# Patient Record
Sex: FEMALE | Race: WHITE
[De-identification: names, ages, dates, MRNs, and addresses within clinical notes are randomized per-mention and may not be internally consistent; named-entity substitution may affect disease eponyms.]

---

## 2017-02-02 ENCOUNTER — HOSPITAL ENCOUNTER (EMERGENCY)
Dept: HOSPITAL 25 - ED | Age: 42
Discharge: HOME | End: 2017-02-02
Payer: COMMERCIAL

## 2017-02-02 VITALS — DIASTOLIC BLOOD PRESSURE: 46 MMHG | SYSTOLIC BLOOD PRESSURE: 111 MMHG
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
## 2017-02-02 DIAGNOSIS — R11.2: ICD-10-CM

## 2017-02-02 DIAGNOSIS — S40.819A: ICD-10-CM
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
## 2017-02-02 DIAGNOSIS — R51: ICD-10-CM

## 2017-02-02 DIAGNOSIS — R53.83: ICD-10-CM

## 2017-02-02 DIAGNOSIS — R50.9: ICD-10-CM

## 2017-02-02 DIAGNOSIS — J02.0: Primary | ICD-10-CM

## 2017-02-02 LAB
ALBUMIN SERPL BCG-MCNC: 4 G/DL (ref 3.2–5.2)
ALP SERPL-CCNC: 56 U/L (ref 34–104)
ALT SERPL W P-5'-P-CCNC: 11 U/L (ref 7–52)
ANION GAP SERPL CALC-SCNC: 9 MMOL/L (ref 2–11)
AST SERPL-CCNC: 13 U/L (ref 13–39)
BUN SERPL-MCNC: 13 MG/DL (ref 6–24)
BUN/CREAT SERPL: 17.3 (ref 8–20)
CALCIUM SERPL-MCNC: 9.1 MG/DL (ref 8.6–10.3)
CHLORIDE SERPL-SCNC: 104 MMOL/L (ref 101–111)
GLOBULIN SER CALC-MCNC: 3.2 G/DL (ref 2–4)
GLUCOSE SERPL-MCNC: 106 MG/DL (ref 70–100)
HCO3 SERPL-SCNC: 20 MMOL/L (ref 22–32)
HCT VFR BLD AUTO: 39 % (ref 35–47)
HGB BLD-MCNC: 13.4 G/DL (ref 12–16)
LIPASE SERPL-CCNC: 18 U/L (ref 11–82)
MCH RBC QN AUTO: 29 PG (ref 27–31)
MCHC RBC AUTO-ENTMCNC: 34 G/DL (ref 31–36)
MCV RBC AUTO: 84 FL (ref 80–97)
POTASSIUM SERPL-SCNC: 3.3 MMOL/L (ref 3.5–5)
PROT SERPL-MCNC: 7.2 G/DL (ref 6.4–8.9)
RBC # BLD AUTO: 4.66 10^6/UL (ref 4–5.4)
SODIUM SERPL-SCNC: 133 MMOL/L (ref 133–145)
WBC # BLD AUTO: 11.6 10^3/UL (ref 3.5–10.8)

## 2017-02-02 PROCEDURE — 86140 C-REACTIVE PROTEIN: CPT

## 2017-02-02 PROCEDURE — 80053 COMPREHEN METABOLIC PANEL: CPT

## 2017-02-02 PROCEDURE — 99283 EMERGENCY DEPT VISIT LOW MDM: CPT

## 2017-02-02 PROCEDURE — 83690 ASSAY OF LIPASE: CPT

## 2017-02-02 PROCEDURE — 85025 COMPLETE CBC W/AUTO DIFF WBC: CPT

## 2017-02-02 PROCEDURE — 87502 INFLUENZA DNA AMP PROBE: CPT

## 2017-02-02 PROCEDURE — 36415 COLL VENOUS BLD VENIPUNCTURE: CPT

## 2017-02-02 PROCEDURE — 96374 THER/PROPH/DIAG INJ IV PUSH: CPT

## 2017-02-02 PROCEDURE — 81003 URINALYSIS AUTO W/O SCOPE: CPT

## 2017-02-02 PROCEDURE — 87651 STREP A DNA AMP PROBE: CPT

## 2017-02-02 PROCEDURE — 81015 MICROSCOPIC EXAM OF URINE: CPT

## 2017-02-02 PROCEDURE — 96375 TX/PRO/DX INJ NEW DRUG ADDON: CPT

## 2017-02-02 PROCEDURE — 83605 ASSAY OF LACTIC ACID: CPT

## 2017-02-02 NOTE — ED
Influenza-Like Illness





- HPI Summary


HPI Summary: 


Pt here w/ acute onset flu-like sx last night while at work. Fever, chills, HA, 

N+V, ST, sneezing, rhinorrhea w/ PND, otalgia, body aches, sore neck glands. 

Denies cough, chest pain, SOB, rash, diarrhea, ab pain. Did not receive 

influenza vaccine this year. 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 Has not tried anything to alleviate 

sx yet. Also reports she was scratched on her hand/arm by her cat 2 days ago - 

area is healing well - no swelling, redness or streaking.  





LMP - having this now. 





- History of Current Complaint


Chief Complaint: EDFluSymptoms


Time Seen by Provider: 17 07:11


Hx Obtained From: Patient





- Allergy/Home Medications


Allergies/Adverse Reactions: 


 Allergies











Allergy/AdvReac Type Severity Reaction Status Date / Time


 


Shellfish Allergy Allergy  Swelling Verified 04/25/15 17:54


 


Antihistamines, AdvReac  See Comment Verified 04/25/15 17:54





Diphenhydramine-typ     














PMH/Surg Hx/FS Hx/Imm Hx


Previously Healthy: Yes


Endocrine/Hematology History: 


   Denies: Hx Diabetes, Autoimmune Disease


Cardiovascular History: 


   Denies: Hx Hypertension, Hx Pacemaker/ICD


Respiratory History: 


   Denies: Hx Asthma


GI History: 


   Denies: Hx Crohn's Disease


Sensory History: 


   Denies: Hx Hearing Aid


Neurological History: Reports: Other Neuro Impairments/Disorders - 

neurofibromatosis


Psychiatric History: 


   Denies: Hx Panic Disorder





- Cancer History


Hx Chemotherapy: No


Hx Radiation Therapy: No





- Surgical History


Surgery Procedure, Year, and Place: TUBAL LIGATION 2006, MULTIPLE FIBROMAS 

REMOVED, 





- Immunization History


Date of Tetanus Vaccine: Unk


Date of Influenza Vaccine: None


Infectious Disease History: No


Infectious Disease History: 


   Denies: History Other Infectious Disease - mono, Traveled Outside the [place] in 

Last 30 Days





- Family History


Known Family History: Positive: Cardiac Disease, Other - cancer





- Social History


Occupation: Employed Full-time


Lives: With Family - children


Alcohol Use: Occasionally - handful of times a year


Hx Substance Use: No


Substance Use Type: Reports: None


Hx Tobacco Use: No


Smoking Status (MU): Never Smoked Tobacco





Review of Systems


Positive: Fever, Chills, Fatigue


Negative: Photophobia, Blurred Vision, Diplopia, Drainage, Erythema


ENT: Other - see HPI


Negative: Chest Pain


Negative: Shortness Of Breath, Cough


Gastrointestinal: Other - see HPI


Positive: no symptoms reported


Positive: Myalgia - see HPI


Negative: Rash


Positive: Headache.  Negative: Weakness, Paresthesia, Numbness


Psychological: Normal


All Other Systems Reviewed And Are Negative: Yes





Physical Exam


Triage Information Reviewed: Yes


Vital Signs On Initial Exam: 


 Initial Vitals











Temp Pulse Resp BP Pulse Ox


 


 102.4 F   97   18   117/58   100 


 


 17 06:56  17 06:56  17 06:56  17 06:56  17 06:56











Vital Signs Reviewed: Yes


Appearance: Positive: Well-Nourished, Ill-Appearing - pt wearing coat with sandhu 

over head, Pain Distress - HA - appears mildly photosensitive


Skin: Positive: Warm, Dry - diffuse skin flesh-colored tumors; scant small 

scabbed linear abrasions over Lt hand/wrist area - no erythema, no edema, no d/c

, no streaking - NTTP


Head/Face: Positive: Normal Head/Face Inspection


Eyes: Positive: Normal, EOMI, EVIN, Conjunctiva Clear, Conjunctiva Inflammed, 

Discharge


ENT: Positive: Hearing grossly normal, Pharyngeal erythema - mild along 

peritonsilar arches w/ cobblestoning, TMs normal.  Negative: Nasal congestion, 

Nasal drainage, Tonsillar swelling, Tonsillar exudate


Neck: Positive: Supple, Tenderness @, Enlarged Nodes @ - shotty anterior cc LN's


Respiratory/Lung Sounds: Positive: Clear to Auscultation, Breath Sounds 

Present.  Negative: Rales, Rhonchi, Stridor, Wheezes


Cardiovascular: Positive: Normal, RRR, S1, S2.  Negative: Murmur, Rub


Abdomen Description: Positive: No Organomegaly, Soft, Other: - mild LUQ TTP - 

no rebounding


Bowel Sounds: Positive: Present


Musculoskeletal: Positive: Normal, Strength/ROM Intact


Neurological: Positive: Normal, Sensory/Motor Intact, Alert, Oriented to Person 

Place, Time, CN Intact II-III


Psychiatric: Positive: Normal





Diagnostics





- Vital Signs


 Vital Signs











  Temp Pulse Resp BP Pulse Ox


 


 17 06:56  102.4 F  97  18  117/58  100














- Laboratory


Result Diagrams: 


 17 07:40





 17 07:40


Lab Statement: Any lab studies that have been ordered have been reviewed, and 

results considered in the medical decision making process.





Re-Evaluation





- Re-Evaluation


  ** First Eval


Change: Improved - fever, HA, nausea, myalgias improved s/p fluids, toradol and 

zofran





Flu Symptom Course/Dx





- Course


Course Of Treatment: Pt's influenza is negative however strep is +. She has a h/

o strep as a child but none as an adult. She could be colonized however has ST 

today so will tx. D/t systemic sx along w/ lymphadenopathy and LUQ (possible 

splenomegaly) will also cover for cat scratch disease in light of her recent 

injury. Will tx w/ zpak as this covers both strep and CSD. Pt advised to f/u w/ 

PCP next week for re-eval and to return here sooner if danger s/sx present. Pt 

agrees w/ plan.





- Diagnoses


Provider Diagnoses: 


 Strep throat, Cat scratch








Discharge





- Discharge Plan


Condition: Stable


Disposition: HOME


Prescriptions: 


Azithromycin TAB* [Zithromax TAB (Z-CECILIA) 250 mg #6 tabs] 250 mg PO DAILY #4 tab


Ondansetron ODT TAB* [Zofran Odt TAB*] 4 mg PO Q8H PRN #9 tab.odt


 PRN Reason: Nausea


Patient Education Materials:  Strep Throat (ED), Cat Scratch Disease (ED)


Forms:  *Work Release


Referrals: 


Kwasi An MD [Primary Care Provider] - 


Additional Instructions: 


You may try the following to help with symptoms:





*Nasal wash (netti pot) & throat gargle 2 x day with 8 ounces of warm water + 1/

4 teaspoon of salt





*Drink 60+ ounces of water daily





*Sleep 8+ hours per night





*You may take ibuprofen alternating with acetaminophen for fever, pain





*Avoid Dairy and sugar





*Hot herbal/decaf tea with lemon & honey  





*Chicken broth (preferably organic, free range chicken)





*Humidifier in house, but especially near bed at night for cough





*Try a facial steam with or without eucalyptus essential oil for cough, nasal 

congestion





**Consider taking  Vitamin C 1,000mg every day during illness**





Start probiotics in between and after completion of antibiotics (ie. Yogurt and/

or capsules of L. acidophilus, L. bifidus, L.  casei, etc - make sure to get 

these from the refrigerated food section as they are live and active cultures)





Follow-up with PCP next week. If symptoms worsen prior, return to ED

## 2017-05-15 ENCOUNTER — HOSPITAL ENCOUNTER (EMERGENCY)
Dept: HOSPITAL 25 - ED | Age: 42
Discharge: HOME | End: 2017-05-15
Payer: COMMERCIAL

## 2017-05-15 VITALS — SYSTOLIC BLOOD PRESSURE: 105 MMHG | DIASTOLIC BLOOD PRESSURE: 37 MMHG
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
## 2017-05-15 DIAGNOSIS — S61.212A: Primary | ICD-10-CM

## 2017-05-15 DIAGNOSIS — S61.214A: ICD-10-CM

## 2017-05-15 PROCEDURE — 90715 TDAP VACCINE 7 YRS/> IM: CPT

## 2017-05-15 PROCEDURE — 99282 EMERGENCY DEPT VISIT SF MDM: CPT

## 2017-05-15 PROCEDURE — 12002 RPR S/N/AX/GEN/TRNK2.6-7.5CM: CPT

## 2017-05-15 PROCEDURE — 90471 IMMUNIZATION ADMIN: CPT

## 2017-05-15 NOTE — ED
Skin Complaint





- HPI Summary


HPI Summary: 


Pt here w/ finger wounds - Rt 3rd and 4th Rt fingers - cut on a paperslicer at 

work today. 3rd finger lac is larger, bleeding more. 4th finger lac smaller and 

stopped bleeding w/ pressure. Not sure of her last tetanus vaccine. No numbness

, tingling, weakness - FROM and strength in affected phalanges and UE in 

general.





- History of Current Complaint


Chief Complaint: EDLacSutureRecheck


Time Seen by Provider: 05/15/17 10:30


Stated Complaint: RT HAND / FINGER LAC


Hx Obtained From: Patient


Pain Intensity: 9





- Allergy/Home Medications


Allergies/Adverse Reactions: 


 Allergies











Allergy/AdvReac Type Severity Reaction Status Date / Time


 


Shellfish Allergy Allergy  Swelling Verified 04/25/15 17:54


 


Antihistamines, AdvReac  See Comment Verified 04/25/15 17:54





Diphenhydramine-typ     














PMH/Surg Hx/FS Hx/Imm Hx


Previously Healthy: Yes


Endocrine/Hematology History: 


   Denies: Hx Anticoagulant Therapy, Hx Blood Disorders, Hx Diabetes, Hx 

Unexplained Bleeding


Cardiovascular History: 


   Denies: Hx Hypertension, Hx Pacemaker/ICD


Respiratory History: 


   Denies: Hx Asthma


GI History: 


   Denies: Hx Crohn's Disease


Sensory History: 


   Denies: Hx Hearing Aid


Neurological History: Reports: Other Neuro Impairments/Disorders - 

neurofibromatosis


Psychiatric History: 


   Denies: Hx Panic Disorder





- Cancer History


Hx Chemotherapy: No


Hx Radiation Therapy: No





- Surgical History


Surgery Procedure, Year, and Place: TUBAL LIGATION 2006, MULTIPLE FIBROMAS 

REMOVED, 





- Immunization History


Date of Tetanus Vaccine: Unk


Date of Influenza Vaccine: None


Infectious Disease History: No


Infectious Disease History: 


   Denies: History Other Infectious Disease - mono, Traveled Outside the [place] in 

Last 30 Days





- Family History


Known Family History: Positive: Cardiac Disease, Other - cancer





- Social History


Occupation: Employed Full-time


Alcohol Use: None


Hx Substance Use: No


Substance Use Type: Reports: None


Hx Tobacco Use: No


Smoking Status (MU): Never Smoked Tobacco





Review of Systems


Negative: Fever, Chills, Fatigue


Negative: Chest Pain


Negative: Shortness Of Breath


Negative: Nausea


Negative: Decreased ROM, Edema


Skin: Other - see HPI


Neurological: Negative


Negative: Weakness, Paresthesia, Numbness


Positive: Anxious


All Other Systems Reviewed And Are Negative: Yes





Physical Exam


Triage Information Reviewed: Yes


Vital Signs On Initial Exam: 


 Initial Vitals











Temp Pulse Resp BP Pulse Ox


 


 96.9 F   68   20   125/57   100 


 


 05/15/17 09:35  05/15/17 09:35  05/15/17 09:35  05/15/17 09:35  05/15/17 09:35











Vital Signs Reviewed: Yes


Appearance: Positive: Well-Appearing, No Pain Distress, Well-Nourished - 

pleasant, concerned


Skin: Positive: Warm - superficial laceration of 4th distal phalange - no 

active bleeding; flap laceration of 3rd distal dorsa phalange - bleeding s/p 

lavage and exploration - black streaking along tissue - skimmed through part of 

subcutaneous tissue - no tendon observed d/t minimal depth - no nerves or 

vessels observed


Head/Face: Positive: Normal Head/Face Inspection


Eyes: Positive: Normal, EOMI, Conjunctiva Clear


ENT: Positive: Hearing grossly normal, Pharynx normal


Respiratory/Lung Sounds: Positive: Breath Sounds Present


Cardiovascular: Positive: Normal, Pulses are Symmetrical in both Upper and 

Lower Extremities


Musculoskeletal: Positive: Normal, Strength/ROM Intact


Neurological: Positive: Normal, Sensory/Motor Intact, Alert, Oriented to Person 

Place, Time, CN Intact II-III, Reflexes Intact


Psychiatric: Positive: Normal





Procedures





- Laceration/Wound Repair


  ** 1


Location: upper extremity


Length, Depth and Shape: flap w/ black streak within tissue - looked like 

necrotic vessel?


Betadine Prep?: Yes


Suture Type: Prolene - 5-0


Number of Sutures: 7





  ** 2


Location: upper extremity - Rt 4th distal dorsal digit, superficial laceration


Description: Irregular - flap


Length, Depth and Shape: 0.5cm x 1.5mm


Betadine Prep?: No


Irrigated w/ Saline (ccs): 500 - iodine solution soak


Laceration/Wound Explored: clean


Number of Sutures: 0


Layer Closure?: No


Sterile Dressing Applied?: Yes - triple anbx + bandaid





Diagnostics





- Vital Signs


 Vital Signs











  Temp Pulse Resp BP Pulse Ox


 


 05/15/17 09:37  98.1 F  66  20  125/57  99


 


 05/15/17 09:35  96.9 F  68  20  125/57  100














- Laboratory


Lab Statement: Any lab studies that have been ordered have been reviewed, and 

results considered in the medical decision making process.





Course/Dx





- Course


Course Of Treatment: WOunds cleaned - no evidence of tendon injury - repaired 

and dressed. Care instructions reviewed and printed.  F/u w/ hand specialist.





- Diagnoses


Provider Diagnoses: 


 Laceration of right middle finger, Laceration of right ring finger








Discharge





- Discharge Plan


Condition: Stable


Disposition: HOME


Patient Education Materials:  Care For Your Stitches (ED), Finger Laceration (ED

)


Forms:  *Work Release


Referrals: 


Aristides Ledezma MD [Medical Doctor] - 


Additional Instructions: 


Keep dressing clean, dry and intact for 48 hours- after this time, you may 

remove dressing - gently wash with soap and water - rinse well and pat dry with 

clean cloth - reapply triple antibiotic ointment and clean dressing. 





Rest, ice, elevate for pain/swelling. You may take ibuprofen alternating with 

acetaminophen for pain.


Follow-up with hand specialist this week for re-eval and suture removal, 

estimated for 10 days from today however will defer to the discretion of 

specialist.


*If you develop redness, swelling, streaking, purulent drainage, fever, seek 

medical attention sooner.

## 2018-11-06 ENCOUNTER — HOSPITAL ENCOUNTER (OUTPATIENT)
Dept: HOSPITAL 25 - OR | Age: 43
Discharge: HOME | End: 2018-11-06
Attending: PLASTIC SURGERY
Payer: COMMERCIAL

## 2018-11-06 VITALS — DIASTOLIC BLOOD PRESSURE: 70 MMHG | SYSTOLIC BLOOD PRESSURE: 104 MMHG

## 2018-11-06 DIAGNOSIS — L84: Primary | ICD-10-CM

## 2018-11-06 DIAGNOSIS — Q85.01: ICD-10-CM

## 2018-11-06 PROCEDURE — 88305 TISSUE EXAM BY PATHOLOGIST: CPT

## 2018-11-06 PROCEDURE — 81025 URINE PREGNANCY TEST: CPT
